# Patient Record
Sex: MALE | Race: WHITE
[De-identification: names, ages, dates, MRNs, and addresses within clinical notes are randomized per-mention and may not be internally consistent; named-entity substitution may affect disease eponyms.]

---

## 2021-06-17 ENCOUNTER — HOSPITAL ENCOUNTER (EMERGENCY)
Dept: HOSPITAL 77 - KA.ED | Age: 66
Discharge: HOME | End: 2021-06-17
Payer: MEDICARE

## 2021-06-17 DIAGNOSIS — Z79.899: ICD-10-CM

## 2021-06-17 DIAGNOSIS — R05: Primary | ICD-10-CM

## 2021-06-17 DIAGNOSIS — Z79.82: ICD-10-CM

## 2021-06-17 LAB
ANION GAP SERPL CALC-SCNC: 10.3 MMOL/L (ref 5–15)
CHLORIDE SERPL-SCNC: 88 MMOL/L (ref 98–107)
SODIUM SERPL-SCNC: 119 MMOL/L (ref 136–145)

## 2021-06-17 PROCEDURE — 80053 COMPREHEN METABOLIC PANEL: CPT

## 2021-06-17 PROCEDURE — 93005 ELECTROCARDIOGRAM TRACING: CPT

## 2021-06-17 PROCEDURE — 99284 EMERGENCY DEPT VISIT MOD MDM: CPT

## 2021-06-17 PROCEDURE — 36415 COLL VENOUS BLD VENIPUNCTURE: CPT

## 2021-06-17 PROCEDURE — 71046 X-RAY EXAM CHEST 2 VIEWS: CPT

## 2021-06-17 PROCEDURE — 84484 ASSAY OF TROPONIN QUANT: CPT

## 2021-06-17 PROCEDURE — 85025 COMPLETE CBC W/AUTO DIFF WBC: CPT

## 2021-06-17 NOTE — CR
______________________________________________________________________________   

  

[id number] RAD/RAD Chest PA And Lateral  

EXAM:  RAD Chest PA And Lateral  

   

 INDICATION:  COUGH  

   

 COMPARISON:  None.  

   

 DISCUSSION:    

   

 Cardiomediastinal silhouette is normal in size and contour.  

   

 No infiltrate, effusion, pneumothorax, or edema. Pulmonary hyperinflation.  

   

 IMPRESSION:  

 No acute cardiopulmonary abnormality.  

   

 Electronically signed by Ghulam Luciano MD on 6/17/2021 8:52 PM  

   

  

Ghulam Luciano DO                 

 06/17/21 2055    

  

Thank you for allowing us to participate in the care of your patient.

## 2021-10-09 ENCOUNTER — HOSPITAL ENCOUNTER (EMERGENCY)
Dept: HOSPITAL 77 - KA.ED | Age: 66
Discharge: HOME | End: 2021-10-09
Payer: MEDICARE
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
## 2021-10-09 DIAGNOSIS — I10: ICD-10-CM

## 2021-10-09 DIAGNOSIS — S83.411A: Primary | ICD-10-CM

## 2021-10-09 DIAGNOSIS — K21.9: ICD-10-CM

## 2021-10-09 DIAGNOSIS — E78.00: ICD-10-CM

## 2021-10-09 DIAGNOSIS — Z87.891: ICD-10-CM

## 2021-10-09 DIAGNOSIS — Z79.82: ICD-10-CM

## 2021-10-09 DIAGNOSIS — Z79.899: ICD-10-CM

## 2021-10-09 NOTE — CR
______________________________________________________________________________   

  

[id number] RAD/RAD Knee Right 3V  

Exam: RAD Knee Right 3V  

   

 Indication:PAIN.  

   

 Comparison: No prior imaging for comparison.  

   

 Discussion/Impression:   

   

 Bipartite patella, normal variant of anatomy.  

   

 Knee joint effusion.  

   

 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  

 varus angulation.  

   

 Electronically signed by Josh Collins MD on 10/9/2021 1:58 PM  

   

  

Josh Collins MD                 

 10/09/21 6940    

  

Thank you for allowing us to participate in the care of your patient.

## 2021-10-09 NOTE — EDM.PDOC
ED HPI GENERAL MEDICAL PROBLEM





- General


Chief Complaint: General


Stated Complaint: RIGHT KNEE PAIN


Time Seen by Provider: 10/09/21 09:35


Source of Information: Reports: Patient


History Limitations: Reports: No Limitations, Other (chronic severe hearing loss

since age 2; wears cochlear implant)





- History of Present Illness


INITIAL COMMENTS - FREE TEXT/NARRATIVE: 





Patient presents with right knee pain.  He thinks he twisted it when he got out 

of a tractor and moved a pallet that was in the way.  He lifted it over a fence 

and thinks that is when he twisted it.  It hurts to walk on but not much with 

standing.  He has bursitis and arthritis in both knees and was just to his 

doctor yesterday concerning them.  He denies any other injuries.  


Treatments PTA: Reports: Cold Therapy


  ** Right Knee


Pain Score (Numeric/FACES): 6





- Related Data


                                    Allergies











Allergy/AdvReac Type Severity Reaction Status Date / Time


 


No Known Drug Allergies Allergy  Other Verified 10/09/21 09:05











Home Meds: 


                                    Home Meds





Aspirin [Aspirin EC] 81 mg PO 0800 06/17/21 [History]


Famotidine 20 mg PO 0800 06/17/21 [History]


Hydrochlorothiazide/Lisinopril [Lisinopril-HCTZ 20-12.5 MG] 1 tab PO 0800 06/17/21 [History]


Multivit-Min/FA/Lycopen/Lutein [Men 50 Plus Multivitamin Tab] 1 each PO 0800 

06/17/21 [History]


Omeprazole 40 mg PO 0630 06/17/21 [History]


Tamsulosin [Tamsulosin 24 Hr] 0.4 mg PO 0800 06/17/21 [History]


atorvaSTATin [Lipitor] 40 mg PO 0800 06/17/21 [History]











Past Medical History


HEENT History: Reports: Hard of Hearing


Cardiovascular History: Reports: High Cholesterol, Hypertension


Gastrointestinal History: Reports: GERD


Genitourinary History: Reports: BPH


Musculoskeletal History: Reports: None





- Past Surgical History


HEENT Surgical History: Reports: Other (See Below)


Other HEENT Surgeries/Procedures: Cochlear implant on right


GI Surgical History: Reports: None


Male  Surgical History: Reports: None


Musculoskeletal Surgical History: Reports: Other (See Below)


Other Musculoskeletal Surgeries/Procedures:: right elbow fracture with plate 

placed.





Social & Family History





- Tobacco Use


Tobacco Use Status *Q: Former Tobacco User


Used Tobacco, but Quit: Yes


Month/Year Tobacco Last Used: quit 1985





- Caffeine Use


Caffeine Use: Reports: Soda





- Alcohol Use


Days Per Week of Alcohol Use: 7


Number of Drinks Per Day: 5


Total Drinks Per Week: 35





- Recreational Drug Use


Recreational Drug Use: No





ED ROS GENERAL





- Review of Systems


Review Of Systems: See Below


Constitutional: Denies: Fever, Weakness


Respiratory: Denies: Shortness of Breath, Cough


Cardiovascular: Denies: Syncope


GI/Abdominal: Denies: Vomiting


Musculoskeletal: Denies: Neck Pain, Shoulder Pain, Arm Pain, Back Pain


Skin: Denies: Cyanosis, Jaundice, Mottled, Pallor, Diaphoresis


Neurological: Denies: Confusion, Seizure, Syncope, Trouble Speaking


Psychiatric: Denies: Agitation, Anxiety, Confusion





ED EXAM, GENERAL





- Physical Exam


Exam: See Below


Exam Limited By: No Limitations


General Appearance: Alert, WD/WN, No Apparent Distress


Eye Exam: Bilateral Eye: EOMI, Normal Inspection, PERRL


Ears: Normal External Exam


Nose: Normal Inspection, No Blood


Throat/Mouth: Normal Inspection, Normal Lips, Normal Voice, No Airway Compromise


Head: Atraumatic, Normocephalic


Neck: Normal Inspection, Full Range of Motion


Respiratory/Chest: No Respiratory Distress, Lungs Clear, Normal Breath Sounds


Cardiovascular: Regular Rate, Rhythm, No Murmur


Back Exam: Normal Inspection, Full Range of Motion


Extremities: Normal Range of Motion, Other (Right knee has full ROM and is 

stable on exam.  There is some pain with palpation of LCL and anteromedial joint

 line.  No pain of patella or lateral knee.  No deformity or crepitus.)


Neurological: Alert, Oriented, Normal Cognition, No Motor/Sensory Deficits


Psychiatric: Normal Affect, Normal Mood


Skin Exam: Warm, Dry, Intact, Normal Color, No Rash





Course





- Vital Signs


Last Recorded V/S: 


                                Last Vital Signs











Temp  96.4 F L  10/09/21 08:50


 


Pulse  76   10/09/21 08:50


 


Resp  18   10/09/21 08:50


 


BP  154/75 H  10/09/21 08:50


 


Pulse Ox  95   10/09/21 08:50














- Re-Assessments/Exams


Free Text/Narrative Re-Assessment/Exam: 





10/09/21 09:58


Xrays show no evidence of acute fracture or pathology.  There appears to be a 

superolateral patellar non-displaced fracture line or osteophyte fragment.  

Waiting on rad report.  Patient is wearing an elastic knee sleeve on both knees 

that he got recently from his doctor.


10/09/21 10:46


There is a delay on radiology reports so will discharge patient and call him if 

any concerns on report.   Discussed findings and recommendations with patient 

and he is discharged to home in stable condition.


10/09/21 11:53


Still no report so will leave note open until it comes.


10/09/21 21:54


Rad report indicated a bipartite patella of normal variant; mild effusion, no 

fracture.





Departure





- Departure


Time of Disposition: 10:48


Disposition: Home, Self-Care 01


Condition: Good


Clinical Impression: 


Knee MCL sprain


Qualifiers:


 Encounter type: initial encounter Laterality: right Qualified Code(s): S83.411A

 - Sprain of medial collateral ligament of right knee, initial encounter








- Discharge Information


Instructions:  Medial Collateral Knee Ligament Sprain


Referrals: 


Rand Navarrete MD [Primary Care Provider] - 


Forms:  ED Department Discharge


Additional Instructions: 


Avoid movements or activities that cause knee pain.  You can use crutches if 

needed.





Continue using the knee sleeve for support and comfort.





Take Ibuprofen 600 mg three times a day for a few days, then as needed.





Ice and elevate the knee for 20 minutes 2-3 times a day if possible for 5 days.





Follow up with your PCP or orthopedic doctor if this isn't improving in a week.





If worsening, recheck sooner in clinic or ER as needed.





Sepsis Event Note (ED)





- Evaluation


Sepsis Screening Result: No Definite Risk

## 2022-06-26 ENCOUNTER — HOSPITAL ENCOUNTER (EMERGENCY)
Dept: HOSPITAL 77 - KA.ED | Age: 67
Discharge: HOME | End: 2022-06-26
Payer: MEDICARE

## 2022-06-26 DIAGNOSIS — N40.0: ICD-10-CM

## 2022-06-26 DIAGNOSIS — K21.9: ICD-10-CM

## 2022-06-26 DIAGNOSIS — E78.00: ICD-10-CM

## 2022-06-26 DIAGNOSIS — M79.89: Primary | ICD-10-CM

## 2022-06-26 DIAGNOSIS — Z79.82: ICD-10-CM

## 2022-06-26 DIAGNOSIS — Z79.899: ICD-10-CM

## 2022-06-26 DIAGNOSIS — I10: ICD-10-CM
